# Patient Record
(demographics unavailable — no encounter records)

---

## 2024-11-05 NOTE — HISTORY OF PRESENT ILLNESS
[FreeTextEntry1] : Mr. Mead presents today for cardiac clearance for cystoscopy transurethral resection of prostate on 10/4/2024 with Dr. Michael Sarmiento.  He is presently without complaints of exertional chest pain, diaphoresis, palpitations, lightheadedness or syncope.  However, he's noticed an increase in dyspnea on minimal exertion these last couple of weeks which has been concerning the patient.    In fact, after walking from his car in the parking lot to his Urologist office yesterday, he was extremely "winded" and short of breath where he needed to sit down in the waiting room to catch his breath.  The Urology staff noticed him struggling to breathe and recommended he go to the ER but patient refused.  The Urology staff then called our office to discuss his symptoms, and we recommended he go to the ER but patient declined stating he will follow up with our office today instead.    There have been no associated symptoms such as exertional CP, diaphoresis, palpitations, dizziness, etc.    Patient had stopped all of his medications just prior to TURP 1 month ago and never restarted. (He was taking Atorvastatin 40 mg and ASA 81 mg QD).    Unfortunately, his TURP was complicated with developing several blood clots in his post-op Moore requiring at least 4 different Moore changes.  The other day, he apparently urinated a lot blood of which alarmed the patient, and he followed up with Urology next day (yesterday) for evaluation.  Reportedly, they extracted his Moore yesterday and flushed out several blood clots from his bladder/prostate and they were apparently satisfied from a urologic perspective.    Today, he continues to report significant dyspnea on minimal exertion, and his blood pressures have been low at home and heart rates have been elevated lately with BP's averaging in the 80s to 90s over 70s and HR's averaging in the 90s to low 100s bpm.  Patient had recent NST and TTE earlier last month showing abnormal perfusion imaging (but similar to prior study), and overall cardiac function was normal with no signs of wall motion abnormalities respectively.  After further questioning, it appears that when he had an MI back in (2009) while vacationing in [place], he followed up months later and eventually underwent a Cardiac Catheterization of which showed significant coronary artery disease, but no intervention done at that time since there were "collaterals" that had developed.  Has not had another cardiac catheterization since that time.

## 2024-11-05 NOTE — REASON FOR VISIT
[FreeTextEntry1] : Mr. Mead is a 69-year-old  male, native of [facility], with a past medical history significant for hyperlipidemia as well as coronary artery disease status-post inferior wall MI in 2009 while visiting [place], who presents for a follow up evaluation after calling our office yesterday while at his Urologist office regarding follow up TURP procedure done on October 4th.

## 2024-11-05 NOTE — REVIEW OF SYSTEMS
[Weight Loss (___ Lbs)] : [unfilled] ~Ulb weight loss [Negative] : Heme/Lymph [Dyspnea on exertion] : dyspnea during exertion

## 2024-11-05 NOTE — PHYSICAL EXAM
[Well Developed] : well developed [No Acute Distress] : no acute distress [Obese] : obese [Normal Conjunctiva] : normal conjunctiva [Normal Venous Pressure] : normal venous pressure [No Carotid Bruit] : no carotid bruit [Normal S1, S2] : normal S1, S2 [No Rub] : no rub [S4] : S4 [Clear Lung Fields] : clear lung fields [Normal Bowel Sounds] : normal bowel sounds [Normal Gait] : normal gait [No Edema] : no edema [Normal] : no rash, no skin lesions [Moves all extremities] : moves all extremities [No Focal Deficits] : no focal deficits [Normal Speech] : normal speech [Alert and Oriented] : alert and oriented [Normal memory] : normal memory [de-identified] : Faint I/VI systolic murmur

## 2024-11-05 NOTE — CARDIOLOGY SUMMARY
[de-identified] : Sinus rhythm at 63 bpm.  Normal intervals.  Qs in leads II, III and aVF consistent with IWMI.  Poor R-wave progression across the precordium.  No acute ischemic changes.

## 2024-11-05 NOTE — DISCUSSION/SUMMARY
[FreeTextEntry1] : 1 - Hypertension:  blood pressure low-normal. Heart rates have been elevated.  Not been on antihypertensive medication.  Has been losing blood s/p TURP procedure.  No recent CBC done.   2 - Coronary artery disease status-post inferior wall MI:  Denies chest pain, orthopnea, PND, palpitations, lightheadedness or syncope.  However, worsening SOB with minimal exertion lately and now showing significant T wave inversions in anteroseptal leads.  (?) shortness of breath could be anginal equivalent.  Patient has multiple comorbidities including possible stress induced anemia s/p TURP with copious blood loss.  Recommend patient go to [facility] emergency department for admittance and to be evaluated for possible anemia and any needed repletion with pRBC, as well as cardiac evaluation for most likely needing a cardiac angiogram to assess for any significant CAD.  (will notify ER patient is on his way to hospital and will notify on-call Cardiologist (Dr. Montelongo)).    3 - Dilatation of the ascending aorta:  patient with known ascending aorta dilatation measuring 4.0 cm.  Most recent echo showing slight progression for AA dilation now at 4.20 cm.  (?) possible LANRE.  Will discuss undergoing Sleep Study in the future once patient is stabilized.    4 - Hyperlipidemia:  patient had recent labs at his PCPs office.  Will call to have the results forwarded to our office.  Continue Atorvastatin 40mg daily.  Follow low fat, low cholesterol, low carb diet.  Fasting blood work prior to follow up visit.    5 - Follow up with Dr. Stroud shortly after discharge from [facility].

## 2024-11-12 NOTE — HISTORY OF PRESENT ILLNESS
[FreeTextEntry1] : 70 y/o male with PMH of HLD, CAD with inferior wall MI in 2009, and BPH s/p recent TURP, who presents to the office for follow up after hospitalization. He was referred to [facility] by his primary cardiologist due to c/o shortness of breath with exertion. In the ED, CTA done: extensive b/l PE with right heart strain. Bilateral LE DVT noted on US. On 11/6/2024 patient underwent Mechanical Thrombectomy with Dr. Dyson. Eliquis loading dose started. Course complicated by hematuria; urology consulted.   Today, patient reports feeling "much better". He is accompanied by his wife. Reports dyspnea with exertion has resolved. Denies chest pain, SOB at rest, DUTTA, palpitations, lightheadedness, dizziness, syncope, near syncope and LE edema. Denies pain to left femoral access site.

## 2024-11-12 NOTE — REVIEW OF SYSTEMS
[Feeling Fatigued] : not feeling fatigued [SOB] : no shortness of breath [Dyspnea on exertion] : not dyspnea during exertion [Chest Discomfort] : no chest discomfort [Lower Ext Edema] : no extremity edema [Palpitations] : no palpitations [Syncope] : no syncope [Dizziness] : no dizziness

## 2024-11-12 NOTE — PHYSICAL EXAM
[Well Developed] : well developed [No Acute Distress] : no acute distress [Normal S1, S2] : normal S1, S2 [No Murmur] : no murmur [Clear Lung Fields] : clear lung fields [No Respiratory Distress] : no respiratory distress  [Normal Gait] : normal gait [No Edema] : no edema [Moves all extremities] : moves all extremities [Normal Speech] : normal speech [Alert and Oriented] : alert and oriented [Normal memory] : normal memory [de-identified] : Left femoral access site is clean, dry and intact with soft ecchymosis, no erythema, edema or hematoma noted.   [de-identified] : Normal DP Left foot. Normal PT right foot

## 2024-11-12 NOTE — DISCUSSION/SUMMARY
[FreeTextEntry1] : 70 y/o male with PMH of HLD, CAD with inferior wall MI in 2009, and BPH s/p recent TURP, who presents to the office for follow up after hospitalization. He was referred to [facility] by his primary cardiologist due to c/o shortness of breath with exertion. In the ED, CTA done: extensive b/l PE with right heart strain. Bilateral LE DVT noted on US. On 11/6/2024 patient underwent Mechanical Thrombectomy with Dr. Dyson. Eliquis loading dose started. Course complicated by hematuria; urology consulted.   Assessment/ Plan: Acute b/l pulmonary embolism with right heart strain, Bilateral LE DVT - CTA chest: extensive b/l PE with right heart strain. - TTE w/ R heart train S/p mechanical thrombectomy 11/6/2024 - on Eliquis. Discussed bleeding risk and when to seek medical care if needed.   Follow up with Primary cardiologist Dr. Stroud tomorrow as planned.  Patient was advised to contact the office or seek emergency medical care for any new or concerning symptoms. Patient verbalized understanding and is in agreement with the above plan.  Sarahy Ewing was present in office at the time of visit. [EKG obtained to assist in diagnosis and management of assessed problem(s)] : EKG obtained to assist in diagnosis and management of assessed problem(s)

## 2024-11-14 NOTE — HISTORY OF PRESENT ILLNESS
[FreeTextEntry1] : Mr. Mead presents today following urgent admission to [facility] where he was found to have bilateral DVTs as well as pulmonary embolization requiring mechanical thrombectomies.   Over the past several weeks and months the patient has been doing extensive traveling both by plan and car. While in [facility], less than two months ago, he was trampled by a cow and apparently sustained a pressure injury to his right lower extremity without fracture. The patient's TURP performed on October 4, 2024, was complicated by considerable clotting requiring multiple postop catheterizations for extraction of clots.   The patient was seen in our office on November 5th where he was noted to be relatively hypotensive and somewhat tachycardic. He complained of significant dyspnea on exertion and had precordial T wave changes requiring urgent admission to [facility] where he was found to have bilateral DVTs and pulmonary emboli.

## 2024-11-14 NOTE — HISTORY OF PRESENT ILLNESS
[FreeTextEntry1] : Mr. Mead presents today following urgent admission to [facility] where he was found to have bilateral DVTs as well as pulmonary embolization requiring mechanical thrombectomies.   Over the past several weeks and months the patient has been doing extensive traveling both by plan and car. While in [facility], less than two months ago, he was trampled by a cow and apparently sustained a pressure injury to his right lower extremity without fracture. The patient's TURP performed on October 4, 2024, was complicated by considerable clotting requiring multiple postop catheterizations for extraction of clots.   The patient was seen in our office on November 5th where he was noted to be relatively hypotensive and somewhat tachycardic. He complained of significant dyspnea on exertion and had precordial T wave changes requiring urgent admission to [facility] where he was found to have bilateral DVTs and pulmonary emboli.

## 2024-11-14 NOTE — REASON FOR VISIT
[FreeTextEntry1] : Mr. Mead is a pleasant 69-year-old,  male native of [facility] with a past medical history significant for hyperlipidemia as well as coronary artery disease s/p inferior wall myocardial infarction in 2009 while visiting [place], and more recently admission to [facility] with progressive shortness of breath following complicated TURP on October 4th, who presents for follow up evaluation.

## 2024-11-14 NOTE — ASSESSMENT
[FreeTextEntry1] : 1. EKG today reveals normal sinus rhythm at 82 bpm. Left axis deviation. Borderline LVH voltage. Prior inferior wall MI noted with Q waves in Leads II, III and aVF. Poor R wave progression across the precordium with T wave inversions. These appear to be resolving when compared to EKG from 11/5/24.   2. Bilateral lower extremity DVTs with associated bilateral pulmonary embolization s/p mechanical thrombectomy. The patient is clinically stable at this point. Breathing has improved significantly. He is currently on Eliquis 5 mg b.i.d. I have recommended he undergo hypercoagulability evaluation to exclude the less than likely possibility of a hypercoagulability syndrome. I suspect that most of what happened to him was related to his recent prostate surgery.   3. Coronary artery disease s/p inferior wall MI in 2009: Clinically stable without chest pain or shortness of breath. We will continue to monitor closely.   4. Advised to walk and continue with current medications. A low-fat / low-cholesterol diet was discussed. Will likely travel to [facility] in the second half of next month. We will reevaluate in three months.

## 2024-11-14 NOTE — PHYSICAL EXAM
[Well Developed] : well developed [No Acute Distress] : no acute distress [Obese] : obese [Normal Conjunctiva] : normal conjunctiva [Normal Venous Pressure] : normal venous pressure [No Carotid Bruit] : no carotid bruit [Normal S1, S2] : normal S1, S2 [No Rub] : no rub [S4] : S4 [Clear Lung Fields] : clear lung fields [Normal Bowel Sounds] : normal bowel sounds [Normal Gait] : normal gait [No Edema] : no edema [Normal] : no rash, no skin lesions [Moves all extremities] : moves all extremities [No Focal Deficits] : no focal deficits [Normal Speech] : normal speech [Alert and Oriented] : alert and oriented [Normal memory] : normal memory [de-identified] : Faint I/VI systolic murmur

## 2024-11-14 NOTE — PHYSICAL EXAM
[Well Developed] : well developed [No Acute Distress] : no acute distress [Obese] : obese [Normal Conjunctiva] : normal conjunctiva [Normal Venous Pressure] : normal venous pressure [No Carotid Bruit] : no carotid bruit [Normal S1, S2] : normal S1, S2 [No Rub] : no rub [S4] : S4 [Clear Lung Fields] : clear lung fields [Normal Bowel Sounds] : normal bowel sounds [Normal Gait] : normal gait [No Edema] : no edema [Normal] : no rash, no skin lesions [Moves all extremities] : moves all extremities [No Focal Deficits] : no focal deficits [Normal Speech] : normal speech [Alert and Oriented] : alert and oriented [Normal memory] : normal memory [de-identified] : Faint I/VI systolic murmur

## 2024-11-14 NOTE — ASSESSMENT
[FreeTextEntry1] : 1. EKG today reveals normal sinus rhythm at 82 bpm. Left axis deviation. Borderline LVH voltage. Prior inferior wall MI noted with Q waves in Leads II, III and aVF. Poor R wave progression across the precordium with T wave inversions. These appear to be resolving when compared to EKG from 11/5/24.   2. Bilateral lower extremity DVTs with associated bilateral pulmonary embolization s/p mechanical thrombectomy. The patient is clinically stable at this point. Breathing has improved significantly. He is currently on Eliquis 5 mg b.i.d. I have recommended he undergo hypercoagulability evaluation to exclude the less than likely possibility of a hypercoagulability syndrome. I suspect that most of what happened to him was related to his recent prostate surgery.   3. Coronary artery disease s/p inferior wall MI in 2009: Clinically stable without chest pain or shortness of breath. We will continue to monitor closely.   4. Advised to walk and continue with current medications. A low-fat / low-cholesterol diet was discussed. Will likely travel to [facility] in the second half of next month. We will reevaluate in three months.

## 2024-11-14 NOTE — REASON FOR VISIT
[FreeTextEntry1] : Mr. Mead is a pleasant 69-year-old,  male native of [facility] with a past medical history significant for hyperlipidemia as well as coronary artery disease s/p inferior wall myocardial infarction in 2009 while visiting [place], and more recently admission to [facility] with progressive shortness of breath following complicated TURP on October 4th, who presents for follow up evaluation.

## 2025-03-28 NOTE — PHYSICAL EXAM
[Well Developed] : well developed [No Acute Distress] : no acute distress [Obese] : obese [Normal Conjunctiva] : normal conjunctiva [Normal Venous Pressure] : normal venous pressure [No Carotid Bruit] : no carotid bruit [Normal S1, S2] : normal S1, S2 [No Rub] : no rub [S4] : S4 [Clear Lung Fields] : clear lung fields [Normal Bowel Sounds] : normal bowel sounds [Normal Gait] : normal gait [No Edema] : no edema [Normal] : no rash, no skin lesions [Moves all extremities] : moves all extremities [No Focal Deficits] : no focal deficits [Normal Speech] : normal speech [Alert and Oriented] : alert and oriented [Normal memory] : normal memory [de-identified] : Faint I/VI systolic murmur

## 2025-03-28 NOTE — HISTORY OF PRESENT ILLNESS
[FreeTextEntry1] : Mr. Mead presents today for follow up evaluation. He denies chest pain, shortness of breath or other symptoms. The patient is status post bilateral DVTs last year resulting in pulmonary embolization requiring mechanical thrombectomies. This came as a direct result of extensive travel both by car and plane. The patient was also trampled by a cow while in [place].  Lastly, the entire picture was complicated by TURP which required multiple catheterizations due to extensive clotting.

## 2025-03-28 NOTE — PHYSICAL EXAM
[Well Developed] : well developed [No Acute Distress] : no acute distress [Obese] : obese [Normal Conjunctiva] : normal conjunctiva [Normal Venous Pressure] : normal venous pressure [No Carotid Bruit] : no carotid bruit [Normal S1, S2] : normal S1, S2 [No Rub] : no rub [S4] : S4 [Clear Lung Fields] : clear lung fields [Normal Bowel Sounds] : normal bowel sounds [Normal Gait] : normal gait [No Edema] : no edema [Normal] : no rash, no skin lesions [Moves all extremities] : moves all extremities [No Focal Deficits] : no focal deficits [Normal Speech] : normal speech [Alert and Oriented] : alert and oriented [Normal memory] : normal memory [de-identified] : Faint I/VI systolic murmur

## 2025-03-28 NOTE — REASON FOR VISIT
[FreeTextEntry1] : Mr. Mead is a pleasant 69-year-old,  male native of [facility] with a past medical history significant for hyperlipidemia as well as coronary artery disease s/p inferior wall myocardial infarction in 2009 while visiting [place], and more recently admission to [facility] with progressive shortness of breath following complicated TURP on October 4th, who presents for follow up evaluation.

## 2025-03-28 NOTE — ASSESSMENT
[FreeTextEntry1] : 1. EKG today reveals normal sinus rhythm at 66bpm. Normal intervals. Qs in leads II, III and aVF consistent with prior inferior wall myocardial infarction. There is LVH voltage.  Nonspecific ST-T changes.   2. Hyperlipidemia: No recent blood work available for review. The patient is to follow a strict low fat/low cholesterol diet and continue current medications.   3. Coronary artery disease status post inferior wall myocardial infarction in 2009: Clinically stable without chest pain, shortness of breath or other symptoms.   4. Bilateral DVTs status post pulmonary embolization with subsequent mechanical thrombectomies. The patient is clinically stable without significant symptoms at this time. He was advised to walk and exercise as much as possible in order to lose weight. If clinically stable, we will re-evaluate in 6 months.

## 2025-03-28 NOTE — REASON FOR VISIT
[FreeTextEntry1] : Mr. Mead is a pleasant 69-year-old,  male native of [facility] with a past medical history significant for hyperlipidemia as well as coronary artery disease s/p inferior wall myocardial infarction in 2009 while visiting [place], and more recently admission to [facility] with progressive shortness of breath following complicated TURP on October 4th, who presents for follow up evaluation.

## 2025-03-28 NOTE — HISTORY OF PRESENT ILLNESS
[FreeTextEntry1] : Mr. Mead presents today for follow up evaluation. He denies chest pain, shortness of breath or other symptoms. The patient is status post bilateral DVTs last year resulting in pulmonary embolization requiring mechanical thrombectomies. This came as a direct result of extensive travel both by car and plane. The patient was also trampled by a cow while in [place] on his farm.  Lastly, the entire picture was complicated by TURP which required multiple catheterizations due to extensive clotting.